# Patient Record
(demographics unavailable — no encounter records)

---

## 2025-03-05 NOTE — DISCUSSION/SUMMARY
[FreeTextEntry1] : In summary, this is a 50 year old man with HLD, CHF s/p ICD Biotronik. Patient presents today for initial evaluation. He has noticed that his heart races around the same time every night for a couple of minutes. Device interrogated at general cardiologist office with HR noted in the 140s every night.   Mr. Lozano appeared to understand the whole discussion and verbalized that all of his questions were answered to his satisfaction.  Thank you for allowing me to be involved in the care of this pleasant man. Please feel free to contact me with any questions.

## 2025-03-05 NOTE — HISTORY OF PRESENT ILLNESS
[FreeTextEntry1] : Referring Physician: Dr. Olga Monson   Dear Dr. Olga Monson:   Mr. Lozano was seen in the [facility] today. For our records, please allow me to summarize the history and my findings.   This pleasant 50-year-old man has a cardiovascular history significant for HLD, CHF s/p ICD Biotronik. Patient presents today for initial evaluation. He has noticed that his heart races around the same time every night for a couple of minutes. Device interrogated at general cardiologist office with HR noted in the 140s every night.    Mr. Lozano denies any recent history of chest pain, shortness of breath, palpitations, dizziness, or syncope.

## 2025-04-09 NOTE — DISCUSSION/SUMMARY
[EKG obtained to assist in diagnosis and management of assessed problem(s)] : EKG obtained to assist in diagnosis and management of assessed problem(s) [FreeTextEntry1] : In summary, this is a 50-year-old man with HLD, CHF s/p ICD Biotronik.  His device was checked today, and he had had some asymptomatic episodes of PAT.  His symptoms correlate to the device doing self-checks at night.  I turned that feature off.  I was able to replicate the symptoms in the office last visit.  He has been feeling much better since. We will establish remote monitoring, and he will return to the office in 6 months.  I do not see a clear indication for Eliquis, and he may stop it if he wishes.  I discussed these findings with his cardiologist.  Mr. Lozano appeared to understand the whole discussion and verbalized that all of his questions were answered to his satisfaction.  Thank you for allowing me to be involved in the care of this pleasant man. Please feel free to contact me with any questions.

## 2025-04-09 NOTE — HISTORY OF PRESENT ILLNESS
[FreeTextEntry1] : Referring Physician: Ermias Espinoza MD   Dear Dr. Espinoza:   Mr. Lozano was seen in the [facility] today. For our records, please allow me to summarize the history and my findings.   This pleasant 50-year-old man has a history significant for HLD, CHF s/p ICD Biotronik.  He presents today for follow-up on his device.  He notes ever since the device was placed he has had episodes of palpitations that occur every night at 11:30 PM.  He was previously followed at [facility] and at that time was told these episodes correlated to some form of atrial tachycardia.  He was placed on Eliquis though he had no evidence of atrial fibrillation.  He states that when he feels the palpitations his heart rates about 140 bpm.  Interrogation of his device during the last visit revealed some episodes of PAT which do not correlate with these episodes.  These episodes correlated to ventricular threshold checks on his device.  His device does ventricular thresholds every night at the time that he feels the symptoms at home. Pt is feeling much better since automatic checks were stopped. He is active with no current issues.    Mr. Lozano denies any recent history of chest pain, shortness of breath, dizziness, or syncope.

## 2025-04-09 NOTE — PHYSICAL EXAM
[Well Developed] : well developed [Well Nourished] : well nourished [No Acute Distress] : no acute distress [Normal Conjunctiva] : normal conjunctiva [Normal Venous Pressure] : normal venous pressure [Normal S1, S2] : normal S1, S2 [No Gallop] : no gallop [Clear Lung Fields] : clear lung fields [Good Air Entry] : good air entry [No Respiratory Distress] : no respiratory distress  [Soft] : abdomen soft [Non Tender] : non-tender [Normal Bowel Sounds] : normal bowel sounds [Normal Gait] : normal gait [No Edema] : no edema [No Cyanosis] : no cyanosis [No Clubbing] : no clubbing [No Varicosities] : no varicosities [No Rash] : no rash [No Skin Lesions] : no skin lesions [Moves all extremities] : moves all extremities [No Focal Deficits] : no focal deficits [Normal Speech] : normal speech [Alert and Oriented] : alert and oriented [Normal memory] : normal memory